# Patient Record
Sex: MALE | Race: WHITE
[De-identification: names, ages, dates, MRNs, and addresses within clinical notes are randomized per-mention and may not be internally consistent; named-entity substitution may affect disease eponyms.]

---

## 2021-12-02 ENCOUNTER — HOSPITAL ENCOUNTER (INPATIENT)
Dept: HOSPITAL 61 - ER | Age: 66
LOS: 5 days | Discharge: HOME | DRG: 177 | End: 2021-12-07
Attending: STUDENT IN AN ORGANIZED HEALTH CARE EDUCATION/TRAINING PROGRAM | Admitting: STUDENT IN AN ORGANIZED HEALTH CARE EDUCATION/TRAINING PROGRAM
Payer: COMMERCIAL

## 2021-12-02 VITALS — HEIGHT: 71 IN | WEIGHT: 212.31 LBS | BODY MASS INDEX: 29.72 KG/M2

## 2021-12-02 DIAGNOSIS — K64.9: ICD-10-CM

## 2021-12-02 DIAGNOSIS — J96.01: ICD-10-CM

## 2021-12-02 DIAGNOSIS — E46: ICD-10-CM

## 2021-12-02 DIAGNOSIS — J12.82: ICD-10-CM

## 2021-12-02 DIAGNOSIS — I10: ICD-10-CM

## 2021-12-02 DIAGNOSIS — E87.6: ICD-10-CM

## 2021-12-02 DIAGNOSIS — Z82.49: ICD-10-CM

## 2021-12-02 DIAGNOSIS — Z88.0: ICD-10-CM

## 2021-12-02 DIAGNOSIS — U07.1: Primary | ICD-10-CM

## 2021-12-02 PROCEDURE — 85025 COMPLETE CBC W/AUTO DIFF WBC: CPT

## 2021-12-02 PROCEDURE — 83605 ASSAY OF LACTIC ACID: CPT

## 2021-12-02 PROCEDURE — 80053 COMPREHEN METABOLIC PANEL: CPT

## 2021-12-02 PROCEDURE — 94618 PULMONARY STRESS TESTING: CPT

## 2021-12-02 PROCEDURE — 93005 ELECTROCARDIOGRAM TRACING: CPT

## 2021-12-02 PROCEDURE — 96365 THER/PROPH/DIAG IV INF INIT: CPT

## 2021-12-02 PROCEDURE — 71045 X-RAY EXAM CHEST 1 VIEW: CPT

## 2021-12-02 PROCEDURE — G0378 HOSPITAL OBSERVATION PER HR: HCPCS

## 2021-12-02 PROCEDURE — 96375 TX/PRO/DX INJ NEW DRUG ADDON: CPT

## 2021-12-02 PROCEDURE — 80048 BASIC METABOLIC PNL TOTAL CA: CPT

## 2021-12-02 PROCEDURE — 83880 ASSAY OF NATRIURETIC PEPTIDE: CPT

## 2021-12-02 PROCEDURE — 36415 COLL VENOUS BLD VENIPUNCTURE: CPT

## 2021-12-02 PROCEDURE — 87040 BLOOD CULTURE FOR BACTERIA: CPT

## 2021-12-02 PROCEDURE — 82803 BLOOD GASES ANY COMBINATION: CPT

## 2021-12-02 PROCEDURE — 84484 ASSAY OF TROPONIN QUANT: CPT

## 2021-12-02 PROCEDURE — 81001 URINALYSIS AUTO W/SCOPE: CPT

## 2021-12-02 PROCEDURE — 96361 HYDRATE IV INFUSION ADD-ON: CPT

## 2021-12-03 VITALS — SYSTOLIC BLOOD PRESSURE: 145 MMHG | DIASTOLIC BLOOD PRESSURE: 61 MMHG

## 2021-12-03 VITALS — DIASTOLIC BLOOD PRESSURE: 73 MMHG | SYSTOLIC BLOOD PRESSURE: 124 MMHG

## 2021-12-03 VITALS — SYSTOLIC BLOOD PRESSURE: 133 MMHG | DIASTOLIC BLOOD PRESSURE: 72 MMHG

## 2021-12-03 VITALS — DIASTOLIC BLOOD PRESSURE: 72 MMHG | SYSTOLIC BLOOD PRESSURE: 131 MMHG

## 2021-12-03 VITALS — DIASTOLIC BLOOD PRESSURE: 74 MMHG | SYSTOLIC BLOOD PRESSURE: 136 MMHG

## 2021-12-03 VITALS — DIASTOLIC BLOOD PRESSURE: 73 MMHG | SYSTOLIC BLOOD PRESSURE: 127 MMHG

## 2021-12-03 LAB
ALBUMIN SERPL-MCNC: 3 G/DL (ref 3.4–5)
ALBUMIN/GLOB SERPL: 0.8 {RATIO} (ref 1–1.7)
ALP SERPL-CCNC: 80 U/L (ref 46–116)
ALT SERPL-CCNC: 45 U/L (ref 16–63)
ANION GAP SERPL CALC-SCNC: 12 MMOL/L (ref 6–14)
AST SERPL-CCNC: 55 U/L (ref 15–37)
BASE EXCESS STD BLDV CALC-SCNC: 5 MMOL/L (ref 0–3)
BASOPHILS # BLD AUTO: 0 X10^3/UL (ref 0–0.2)
BASOPHILS NFR BLD: 0 % (ref 0–3)
BILIRUB SERPL-MCNC: 0.7 MG/DL (ref 0.2–1)
BUN SERPL-MCNC: 14 MG/DL (ref 8–26)
BUN/CREAT SERPL: 13 (ref 6–20)
CALCIUM SERPL-MCNC: 8.4 MG/DL (ref 8.5–10.1)
CHLORIDE SERPL-SCNC: 95 MMOL/L (ref 98–107)
CO2 BLDV-SCNC: 31 MMOL/L (ref 21–32)
CO2 SERPL-SCNC: 27 MMOL/L (ref 21–32)
CREAT SERPL-MCNC: 1.1 MG/DL (ref 0.7–1.3)
EOSINOPHIL NFR BLD: 0 % (ref 0–3)
EOSINOPHIL NFR BLD: 0 X10^3/UL (ref 0–0.7)
ERYTHROCYTE [DISTWIDTH] IN BLOOD BY AUTOMATED COUNT: 14 % (ref 11.5–14.5)
GFR SERPLBLD BASED ON 1.73 SQ M-ARVRAT: 67.4 ML/MIN
GLUCOSE SERPL-MCNC: 108 MG/DL (ref 70–99)
HCO3 BLDV-SCNC: 30 MMOL/L (ref 24–28)
HCT VFR BLD CALC: 46.4 % (ref 39–53)
HGB BLD-MCNC: 16.1 G/DL (ref 13–17.5)
INSPIRATION SETTING TIME VENT: 21
LYMPHOCYTES # BLD: 0.6 X10^3/UL (ref 1–4.8)
LYMPHOCYTES NFR BLD AUTO: 9 % (ref 24–48)
MCH RBC QN AUTO: 31 PG (ref 25–35)
MCHC RBC AUTO-ENTMCNC: 35 G/DL (ref 31–37)
MCV RBC AUTO: 90 FL (ref 79–100)
MONO #: 0.5 X10^3/UL (ref 0–1.1)
MONOCYTES NFR BLD: 7 % (ref 0–9)
NEUT #: 5.9 X10^3/UL (ref 1.8–7.7)
NEUTROPHILS NFR BLD AUTO: 84 % (ref 31–73)
PCO2 BLDV: 45 MMHG (ref 41–51)
PH BLDV: 7.43 [PH] (ref 7.32–7.42)
PLATELET # BLD AUTO: 180 X10^3/UL (ref 140–400)
PO2 BLDV: 42 MMHG (ref 20–40)
POTASSIUM SERPL-SCNC: 3.2 MMOL/L (ref 3.5–5.1)
PROT SERPL-MCNC: 7 G/DL (ref 6.4–8.2)
RBC # BLD AUTO: 5.15 X10^6/UL (ref 4.3–5.7)
SAO2 % BLDV FROM PO2: 78 %
SODIUM SERPL-SCNC: 134 MMOL/L (ref 136–145)
WBC # BLD AUTO: 7 X10^3/UL (ref 4–11)

## 2021-12-03 PROCEDURE — XW033E5 INTRODUCTION OF REMDESIVIR ANTI-INFECTIVE INTO PERIPHERAL VEIN, PERCUTANEOUS APPROACH, NEW TECHNOLOGY GROUP 5: ICD-10-PCS | Performed by: INTERNAL MEDICINE

## 2021-12-03 RX ADMIN — HEPARIN SODIUM SCH UNIT: 5000 INJECTION, SOLUTION INTRAVENOUS; SUBCUTANEOUS at 22:42

## 2021-12-03 NOTE — HP
DATE OF SERVICE: 12/03/2021

ADMIT DATE: 12/03/2021

CHIEF COMPLAINT:  Respiratory failure and shortness of breath and cough, 

COVID-19.



HISTORY OF PRESENT ILLNESS:  The patient is a pleasant 66-year-old male who was 

diagnosed with COVID-19 four days ago.  He has been treated as an outpatient, 

but his symptoms are worsening.  It should be noted that he did get the first 

dose of the Pfizer vaccine.  I discussed the case with the ER physician.  It 

appears the patient needs full COVID protocol.  We are going to admit the 

patient and consult Pulmonary Medicine, give him remdesivir, antibiotics, 

vitamins, minerals, cough syrup, oxygen, aspirin and Lovenox.



PAST MEDICAL HISTORY:  Gastritis, hemorrhoids, reaction to anesthesia, umbilical

hernia repair, right arm nerve decompression.



ALLERGIES:  PENICILLIN.



FAMILY HISTORY:  Hypertension.



SOCIAL HISTORY:  He does not drink, smoke or take drugs.  He makes a parts for 

[employer].



MEDICATIONS:  Reviewed, please refer to the MRAD.



REVIEW OF SYSTEMS:

GENERAL:  No history of weight change, weakness or fevers.

SKIN:  No bruising, hair changes or rashes.

EYES:  No blurred, double or loss of vision.

NOSE AND THROAT:  No history of nosebleeds, hoarseness or sore throat.

HEART:  No history of palpitations, chest pain or shortness of breath on 

exertion.

LUNGS:  He complains of shortness of breath and cough.

GASTROINTESTINAL:  Denies changes in appetite, nausea, vomiting, diarrhea or 

constipation.

GENITOURINARY:  No history of frequency, urgency, hesitancy or nocturia.

NEUROLOGIC:  Denies history of numbness, tingling, tremor or weakness.

PSYCHIATRIC:  No history of panic, anxiety or depression.

ENDOCRINE:  No history of heat or cold intolerance, polyuria or polydipsia.

EXTREMITIES:  Denies muscle weakness, joint pain, pain on walking or stiffness.



PHYSICAL EXAMINATION:

VITALS:  Within normal limits and are stable.

GENERAL:  No apparent distress.  Alert and oriented.

HEENT:  Normal cephalic atraumatic, external auditory canals are patent.  Eyes: 

Extraocular muscles are intact, pupils are equally round and reactive to light 

and accommodation.

MUSCULOSKELETAL:  Well developed, well nourished, good range of motion.

ENDOCRINE:  No thyromegaly was palpated.

LYMPHATICS:  No cervical chain or axillary nodes were noted.

HEMATOPOIETIC:  No bruising.

NECK:  Supple, no JVD, no thyromegaly was noted.

LUNGS:  He has bibasilar crackles.

HEART:  RRR, S1, S2 present.  Peripheral pulses intact, no obvious murmurs were 

noted.

ABDOMEN:  Soft, nontender.  Positive bowel sounds, no organomegaly, normal bowel

sounds.

EXTREMITIES:  Without any cyanosis, clubbing, or edema.  Pedal pulses intact, 

Homans sign is negative.

NEUROLOGIC:  Normal speech, normal tone.  A and O x 3, moves all extremities, no

obvious focal deficits.

PSYCHIATRIC:  Normal affect, normal mood.  Stable.

SKIN:  No ulcerations or rashes, good skin turgor, no jaundice.

VASCULAR:  Good capillary refill, neurovascular bundle appears to be intact.



LABORATORY DATA:  White count is 7.  Electrolytes:  Sodium 134, potassium 3.2, 

chloride 95, bicarbonate 27, BUN 14, creatinine 1.1, glucose 108.  Chest x-ray 

shows bilateral airspace opacities suggestive of pneumonia.



ASSESSMENT AND PLAN:  COVID-19 respiratory failure.  The patient has been 

admitted.  We will start him on IV Solu-Medrol, remdesivir, oxygen per nasal 

cannula, antibiotics, vitamins and minerals, beta agonist, codeine cough syrup, 

aspirin, and Lovenox.  Home meds.  Deep venous thrombosis prophylaxis.  Full 

code.  Prognosis guarded.







CÉSAR/RAE

DR: Tam   DD: 12/03/2021 11:59

DT: 12/03/2021 12:20   TID: [id number]

## 2021-12-03 NOTE — EKG
[facility]

              [address] [place] [address]

Test Date:    2021               Test Time:    01:43:22

Pat Name:     JARED DOS SANTOS          Department:   

Patient ID:   [id number]           Room:         2 1

Gender:       M                        Technician:   

:          [id number]               Requested By: SANDIP DURÁN

Order Number: [id number]           Reading MD:   Ricco Knapp MD

                                 Measurements

Intervals                              Axis          

Rate:         79                       P:            -59

NJ:           124                      QRS:          -25

QRSD:         102                      T:            42

QT:           370                                    

QTc:          425                                    

                           Interpretive Statements

SINUS RHYTHM

Electronically Signed On 2021 20:54:15 CST by Ricco Knapp MD

## 2021-12-03 NOTE — PDOC
PULMONARY PROGRESS NOTES


DATE: 12/3/21 


TIME: 08:53


Vitals





Vital Signs








  Date Time  Temp Pulse Resp B/P (MAP) Pulse Ox O2 Delivery O2 Flow Rate FiO2


 


12/3/21 07:00 97.5 68 20 136/74 (94) 92 Nasal Cannula  





 97.5       


 


12/3/21 04:00       2.0 








Labs





Laboratory Tests








Test


 12/3/21


00:59 12/3/21


02:53


 


White Blood Count


 7.0 x10^3/uL


(4.0-11.0) 





 


Red Blood Count


 5.15 x10^6/uL


(4.30-5.70) 





 


Hemoglobin


 16.1 g/dL


(13.0-17.5) 





 


Hematocrit


 46.4 %


(39.0-53.0) 





 


Mean Corpuscular Volume 90 fL ()  


 


Mean Corpuscular Hemoglobin 31 pg (25-35)  


 


Mean Corpuscular Hemoglobin


Concent 35 g/dL


(31-37) 





 


Red Cell Distribution Width


 14.0 %


(11.5-14.5) 





 


Platelet Count


 180 x10^3/uL


(140-400) 





 


Neutrophils (%) (Auto) 84 % (31-73)  


 


Lymphocytes (%) (Auto) 9 % (24-48)  


 


Monocytes (%) (Auto) 7 % (0-9)  


 


Eosinophils (%) (Auto) 0 % (0-3)  


 


Basophils (%) (Auto) 0 % (0-3)  


 


Neutrophils # (Auto)


 5.9 x10^3/uL


(1.8-7.7) 





 


Lymphocytes # (Auto)


 0.6 x10^3/uL


(1.0-4.8) 





 


Monocytes # (Auto)


 0.5 x10^3/uL


(0.0-1.1) 





 


Eosinophils # (Auto)


 0.0 x10^3/uL


(0.0-0.7) 





 


Basophils # (Auto)


 0.0 x10^3/uL


(0.0-0.2) 





 


Sodium Level


 134 mmol/L


(136-145) 





 


Potassium Level


 3.2 mmol/L


(3.5-5.1) 





 


Chloride Level


 95 mmol/L


() 





 


Carbon Dioxide Level


 27 mmol/L


(21-32) 





 


Anion Gap 12 (6-14)  


 


Blood Urea Nitrogen


 14 mg/dL


(8-26) 





 


Creatinine


 1.1 mg/dL


(0.7-1.3) 





 


Estimated GFR


(Cockcroft-Gault) 67.4 


 





 


BUN/Creatinine Ratio 13 (6-20)  


 


Glucose Level


 108 mg/dL


(70-99) 





 


Lactic Acid Level


 1.2 mmol/L


(0.4-2.0) 





 


Calcium Level


 8.4 mg/dL


(8.5-10.1) 





 


Total Bilirubin


 0.7 mg/dL


(0.2-1.0) 





 


Aspartate Amino Transf


(AST/SGOT) 55 U/L (15-37) 


 





 


Alanine Aminotransferase


(ALT/SGPT) 45 U/L (16-63) 


 





 


Alkaline Phosphatase


 80 U/L


() 





 


Troponin I High Sensitivity 8 ng/L (4-75)  


 


NT-Pro-B-Type Natriuretic


Peptide 66 pg/mL


(0-124) 





 


Total Protein


 7.0 g/dL


(6.4-8.2) 





 


Albumin


 3.0 g/dL


(3.4-5.0) 





 


Albumin/Globulin Ratio 0.8 (1.0-1.7)  


 


Bedside Venous pH


 


 7.43


(7.32-7.42)


 


Bedside Venous pCO2


 


 45 mmHg


(41-51)


 


Bedside Venous pO2


 


 42 mmHg


(20-40)


 


Venous Blood HCO3


 


 30 mmol/L


(24-28)


 


POC Venous O2 Saturation


(Saul) 


 78 % 





 


Bedside FiO2  21.0 








Laboratory Tests








Test


 12/3/21


00:59 12/3/21


02:53


 


White Blood Count


 7.0 x10^3/uL


(4.0-11.0) 





 


Red Blood Count


 5.15 x10^6/uL


(4.30-5.70) 





 


Hemoglobin


 16.1 g/dL


(13.0-17.5) 





 


Hematocrit


 46.4 %


(39.0-53.0) 





 


Mean Corpuscular Volume 90 fL ()  


 


Mean Corpuscular Hemoglobin 31 pg (25-35)  


 


Mean Corpuscular Hemoglobin


Concent 35 g/dL


(31-37) 





 


Red Cell Distribution Width


 14.0 %


(11.5-14.5) 





 


Platelet Count


 180 x10^3/uL


(140-400) 





 


Neutrophils (%) (Auto) 84 % (31-73)  


 


Lymphocytes (%) (Auto) 9 % (24-48)  


 


Monocytes (%) (Auto) 7 % (0-9)  


 


Eosinophils (%) (Auto) 0 % (0-3)  


 


Basophils (%) (Auto) 0 % (0-3)  


 


Neutrophils # (Auto)


 5.9 x10^3/uL


(1.8-7.7) 





 


Lymphocytes # (Auto)


 0.6 x10^3/uL


(1.0-4.8) 





 


Monocytes # (Auto)


 0.5 x10^3/uL


(0.0-1.1) 





 


Eosinophils # (Auto)


 0.0 x10^3/uL


(0.0-0.7) 





 


Basophils # (Auto)


 0.0 x10^3/uL


(0.0-0.2) 





 


Sodium Level


 134 mmol/L


(136-145) 





 


Potassium Level


 3.2 mmol/L


(3.5-5.1) 





 


Chloride Level


 95 mmol/L


() 





 


Carbon Dioxide Level


 27 mmol/L


(21-32) 





 


Anion Gap 12 (6-14)  


 


Blood Urea Nitrogen


 14 mg/dL


(8-26) 





 


Creatinine


 1.1 mg/dL


(0.7-1.3) 





 


Estimated GFR


(Cockcroft-Gault) 67.4 


 





 


BUN/Creatinine Ratio 13 (6-20)  


 


Glucose Level


 108 mg/dL


(70-99) 





 


Lactic Acid Level


 1.2 mmol/L


(0.4-2.0) 





 


Calcium Level


 8.4 mg/dL


(8.5-10.1) 





 


Total Bilirubin


 0.7 mg/dL


(0.2-1.0) 





 


Aspartate Amino Transf


(AST/SGOT) 55 U/L (15-37) 


 





 


Alanine Aminotransferase


(ALT/SGPT) 45 U/L (16-63) 


 





 


Alkaline Phosphatase


 80 U/L


() 





 


Troponin I High Sensitivity 8 ng/L (4-75)  


 


NT-Pro-B-Type Natriuretic


Peptide 66 pg/mL


(0-124) 





 


Total Protein


 7.0 g/dL


(6.4-8.2) 





 


Albumin


 3.0 g/dL


(3.4-5.0) 





 


Albumin/Globulin Ratio 0.8 (1.0-1.7)  


 


Bedside Venous pH


 


 7.43


(7.32-7.42)


 


Bedside Venous pCO2


 


 45 mmHg


(41-51)


 


Bedside Venous pO2


 


 42 mmHg


(20-40)


 


Venous Blood HCO3


 


 30 mmol/L


(24-28)


 


POC Venous O2 Saturation


(Saul) 


 78 % 





 


Bedside FiO2  21.0 








Medications





Active Scripts








 Medications  Dose


 Route/Sig


 Max Daily Dose Days Date Category


 


 Triamterene-Hctz


 37.5-25 Mg Cp


  (Triamterene/Hydrochlorothiazid)


 1 Each Capsule  1 Cap


 PO DAILY


   6/11/19 Reported


 


 Micardis Hct


 80-12.5 Mg Tablet


  (Telmisartan/Hydrochlorothiazid)


 1 Each Tablet  1 Tab


 PO DAILY


   6/11/19 Reported


 


 Ranitidine Hcl


 150 Mg Capsule  1 Cap


 PO BID


   12/21/16 Reported


 


 Aspir 81


  (Aspirin) 81 Mg


 Tablet.  1 Tab


 PO DAILY


   12/21/16 Reported











Impression


.


Respiratory failure secondary to COVID-19 viral pneumonia see orders











STACEY MARADIAGA MD               Dec 3, 2021 08:53

## 2021-12-03 NOTE — RAD
EXAM:  XR CHEST 1V 12/3/2021 12:52 AM



CLINICAL INDICATION:  Covid



COMPARISON:  None



TECHNIQUE:  AP upright view of the chest



FINDINGS:  The heart is mildly enlarged. Lungs are adequately expanded. There are airspace opacities 
in the perihilar left lung and a few scattered opacities in the right lung. No pleural effusion or pn
eumothorax. No acute osseous abnormality.





IMPRESSION:  Bilateral airspace opacities, which could be due to pneumonia or pulmonary edema.





Electronically signed by: Janice Irizarry MD (12/3/2021 1:06 AM) [facility]

## 2021-12-03 NOTE — NUR
SW following. Discussed with RN, pt from home with spouse, 2L (does not use oxygen at home), 
regular diet, independent. COVID-19 positive. RN advised no SW needs at this time. SW will 
continue to follow.

## 2021-12-03 NOTE — ED.ADGEN
Past Medical History


Additional Past Medical Histor:  GASTRITIS, HEMORRHOIDS, REACTION TO ANESTHESIA


Past Surgical History:  Other


Additional Past Surgical Histo:  UMBILICAL HERNIA REPAIR, RIGHT ARM NERVE 

DECOMPRESSION





General Adult


EDM:


Chief Complaint:  SHORTNESS OF BREATH





HPI:


HPI:





Patient is a 66  year old 66-year-old male who is here for worsening Covid 

symptoms. Patient states he was diagnosed with COVID-19 4 days ago. Patient 

states that 2 days prior to that he had gotten his first dose of his Pfizer 

vaccine and felt well at that time, symptoms started about 5 days ago. Patient 

has been taking Tylenol for his fevers but no other medications. States he has a

productive cough, has dyspnea with exertion, decreased appetite, abdominal pain.

Denies any vomiting or diarrhea. Has a history of hypertension. He states that 

checking his oxygen at home his pulse ox got down to 83 to 84%. Denies any 

history of lung disease or smoking





Review of Systems:


Review of Systems:


All other systems within normal limits except for as noted in the HPI





Current Medications:





Current Medications








 Medications


  (Trade)  Dose


 Ordered  Sig/Rosalba  Start Time


 Stop Time Status Last Admin


Dose Admin


 


 Acetaminophen


  (Tylenol)  1,000 mg  1X  ONCE  12/3/21 01:00


 12/3/21 01:01 DC 12/3/21 01:04


1,000 MG


 


 Sodium Chloride  1,000 ml @ 


 1,000 mls/hr  1X  ONCE  12/3/21 01:00


 12/3/21 01:59  12/3/21 01:00


1,000 MLS/HR











Allergies:


Allergies:





Allergies








Coded Allergies Type Severity Reaction Last Updated Verified


 


  Penicillins Allergy Intermediate Hives 19 Yes











Physical Exam:


PE:


Constitutional: Well developed, well nourished, no acute distress, non-toxic 

appearance. []


HENT: Normocephalic, atraumatic, bilateral external ears normal,  nose normal. 

[]


Eyes: PERRLA, conjunctiva normal, no discharge. [] 


Neck: No rigidity, supple, no stridor. [] 


Cardiovascular: Regular rate and rhythm, brisk cap refill []


Lungs & Thorax: Non labored symmetric respirations, no tachypnea or respiratory 

distress []


Abdomen: Soft, nondistended.


Skin: Warm, dry, no erythema, no rash. [] 


Back: Unremarkable


Extremities: No deformities, range of motion grossly intact, no lower extremity 

edema [] 


Neurologic: Alert and oriented X 3, no focal deficits noted. []


Psychologic: Affect normal, judgement normal, mood normal. []





Current Patient Data:


Labs:





                                Laboratory Tests








Test


 12/3/21


00:59


 


White Blood Count


 7.0 x10^3/uL


(4.0-11.0)


 


Red Blood Count


 5.15 x10^6/uL


(4.30-5.70)


 


Hemoglobin


 16.1 g/dL


(13.0-17.5)


 


Hematocrit


 46.4 %


(39.0-53.0)


 


Mean Corpuscular Volume


 90 fL ()





 


Mean Corpuscular Hemoglobin 31 pg (25-35)  


 


Mean Corpuscular Hemoglobin


Concent 35 g/dL


(31-37)


 


Red Cell Distribution Width


 14.0 %


(11.5-14.5)


 


Platelet Count


 180 x10^3/uL


(140-400)


 


Neutrophils (%) (Auto) 84 % (31-73)  H


 


Lymphocytes (%) (Auto) 9 % (24-48)  L


 


Monocytes (%) (Auto) 7 % (0-9)  


 


Eosinophils (%) (Auto) 0 % (0-3)  


 


Basophils (%) (Auto) 0 % (0-3)  


 


Neutrophils # (Auto)


 5.9 x10^3/uL


(1.8-7.7)


 


Lymphocytes # (Auto)


 0.6 x10^3/uL


(1.0-4.8)  L


 


Monocytes # (Auto)


 0.5 x10^3/uL


(0.0-1.1)


 


Eosinophils # (Auto)


 0.0 x10^3/uL


(0.0-0.7)


 


Basophils # (Auto)


 0.0 x10^3/uL


(0.0-0.2)


 


Sodium Level


 134 mmol/L


(136-145)  L


 


Potassium Level


 3.2 mmol/L


(3.5-5.1)  L


 


Chloride Level


 95 mmol/L


()  L


 


Carbon Dioxide Level


 27 mmol/L


(21-32)


 


Anion Gap 12 (6-14)  


 


Blood Urea Nitrogen


 14 mg/dL


(8-26)


 


Creatinine


 1.1 mg/dL


(0.7-1.3)


 


Estimated GFR


(Cockcroft-Gault) 67.4  





 


BUN/Creatinine Ratio 13 (6-20)  


 


Glucose Level


 108 mg/dL


(70-99)  H


 


Lactic Acid Level


 1.2 mmol/L


(0.4-2.0)


 


Calcium Level


 8.4 mg/dL


(8.5-10.1)  L


 


Total Bilirubin


 0.7 mg/dL


(0.2-1.0)


 


Aspartate Amino Transferase


(AST) 55 U/L (15-37)


H


 


Alanine Aminotransferase (ALT)


 45 U/L (16-63)





 


Alkaline Phosphatase


 80 U/L


()


 


Troponin I High Sensitivity 8 ng/L (4-75)  


 


NT-Pro-B-Type Natriuretic


Peptide 66 pg/mL


(0-124)


 


Total Protein


 7.0 g/dL


(6.4-8.2)


 


Albumin


 3.0 g/dL


(3.4-5.0)  L


 


Albumin/Globulin Ratio


 0.8 (1.0-1.7)


L





                                Laboratory Tests


12/3/21 00:59








                                Laboratory Tests


12/3/21 00:59








Vital Signs:





                                   Vital Signs








  Date Time  Temp Pulse Resp B/P (MAP) Pulse Ox O2 Delivery O2 Flow Rate FiO2


 


12/3/21 00:20 99.7 92 20 124/69 (87) 92 Room Air  





 99.7       











EKG:


EKG:


Sinus rhythm, heart rate 70/min, left extubation, no ST elevation depression, no

 ectopy []





Heart Score:


C/O Chest Pain:  No


HEART Score for Chest Pain:  








HEART Score for Chest Pain Response (Comments) Value


 


History Slighlty/Non-Suspicious 0


 


ECG Normal 0


 


Age >45 - < 65 1


 


Risk Factors                            1 or 2 Risk Factors 1


 


Troponin < Normal Limit 0


 


Total  2








Risk Factors:


Risk Factors:  DM, Current or recent (<one month) smoker, HTN, HLP, family 

history of CAD, obesity.


Risk Scores:


Score 0 - 3:  2.5% MACE over next 6 weeks - Discharge Home


Score 4 - 6:  20.3% MACE over next 6 weeks - Admit for Clinical Observation


Score 7 - 10:  72.7% MACE over next 6 weeks - Early Invasive Strategies





Radiology/Procedures:


Radiology/Procedures:


[facility]


                    [address] [place] [address]


                                 [phone]


                                        


                                 IMAGING REPORT





                                     Signed





PATIENT: JARED DOS SANTOS BACCOUNT: [id number]     MRN#: [id number]


: 1955           LOCATION: ER              AGE: 66


SEX: M                    EXAM DT: 21         ACCESSION#: [id number]


STATUS: REG ER            ORD. PHYSICIAN: JANICE DURÁN MD


REASON: covid


PROCEDURE: CHEST AP ONLY





EXAM:  XR CHEST 1V 12/3/2021 12:52 AM





CLINICAL INDICATION:  Covid





COMPARISON:  None





TECHNIQUE:  AP upright view of the chest





FINDINGS:  The heart is mildly enlarged. Lungs are adequately expanded. There 

are airspace opacities in the perihilar left lung and a few scattered opacities 

in the right lung. No pleural effusion or pneumothorax. No acute osseous 

abnormality.








IMPRESSION:  Bilateral airspace opacities, which could be due to pneumonia or 

pulmonary edema.








Electronically signed by: Janiec Irizarry MD (12/3/2021 1:06 AM) [facility]














DICTATED and SIGNED BY:     JANICE IRIZARRY MD


DATE:     21 [id number]


[]





Course & Med Decision Making:


Course & Med Decision Making


Pertinent Labs and Imaging studies reviewed. (See chart for details)


Patient De-satting to 88%, placed on nasal cannula to hospitalist.


[]





Dragon Disclaimer:


Dragon Disclaimer:


This electronic medical record was generated, in whole or in part, using a voice

 recognition dictation system.





Departure


Departure


Impression:  


   Primary Impression:  


   COVID


Disposition:   ADMITTED AS INPATIENT


Admitting Physician:  HIMS


Condition:  STABLE


Referrals:  


SORAIDA SINGH MD (PCP)











JANICE DURÁN MD             Dec 3, 2021 00:42

## 2021-12-04 VITALS — SYSTOLIC BLOOD PRESSURE: 126 MMHG | DIASTOLIC BLOOD PRESSURE: 78 MMHG

## 2021-12-04 VITALS — DIASTOLIC BLOOD PRESSURE: 80 MMHG | SYSTOLIC BLOOD PRESSURE: 138 MMHG

## 2021-12-04 VITALS — SYSTOLIC BLOOD PRESSURE: 123 MMHG | DIASTOLIC BLOOD PRESSURE: 76 MMHG

## 2021-12-04 VITALS — SYSTOLIC BLOOD PRESSURE: 125 MMHG | DIASTOLIC BLOOD PRESSURE: 73 MMHG

## 2021-12-04 VITALS — SYSTOLIC BLOOD PRESSURE: 115 MMHG | DIASTOLIC BLOOD PRESSURE: 74 MMHG

## 2021-12-04 VITALS — SYSTOLIC BLOOD PRESSURE: 122 MMHG | DIASTOLIC BLOOD PRESSURE: 73 MMHG

## 2021-12-04 LAB
APTT PPP: YELLOW S
BACTERIA #/AREA URNS HPF: 0 /HPF
BILIRUB UR QL STRIP: NEGATIVE
FIBRINOGEN PPP-MCNC: CLEAR MG/DL
NITRITE UR QL STRIP: NEGATIVE
PH UR STRIP: 6 [PH]
PROT UR STRIP-MCNC: 30 MG/DL
RBC #/AREA URNS HPF: (no result) /HPF (ref 0–2)
UROBILINOGEN UR-MCNC: 0.2 MG/DL
WBC #/AREA URNS HPF: 0 /HPF (ref 0–4)

## 2021-12-04 RX ADMIN — REMDESIVIR SCH MLS/HR: 100 INJECTION, POWDER, LYOPHILIZED, FOR SOLUTION INTRAVENOUS at 11:41

## 2021-12-04 RX ADMIN — LOSARTAN POTASSIUM SCH MG: 50 TABLET ORAL at 09:31

## 2021-12-04 RX ADMIN — HEPARIN SODIUM SCH UNIT: 5000 INJECTION, SOLUTION INTRAVENOUS; SUBCUTANEOUS at 06:20

## 2021-12-04 RX ADMIN — HEPARIN SODIUM SCH UNIT: 5000 INJECTION, SOLUTION INTRAVENOUS; SUBCUTANEOUS at 15:03

## 2021-12-04 RX ADMIN — PANTOPRAZOLE SODIUM SCH MG: 40 TABLET, DELAYED RELEASE ORAL at 06:20

## 2021-12-04 RX ADMIN — HEPARIN SODIUM SCH UNIT: 5000 INJECTION, SOLUTION INTRAVENOUS; SUBCUTANEOUS at 22:00

## 2021-12-04 RX ADMIN — ASPIRIN SCH MG: 81 TABLET, COATED ORAL at 09:31

## 2021-12-04 RX ADMIN — TRIAMTERENE AND HYDROCHLOROTHIAZIDE SCH TAB: 37.5; 25 TABLET ORAL at 09:31

## 2021-12-04 NOTE — PDOC
[facility] PROGRESS NOTE


Date of Service


DOS:


DATE: 12/4/21 


TIME: 11:15





Chief Complaint


Chief Complaint


Covid-19 respiratory failure


Gastritis, hemorrhoids, reaction to anesthesia, umbilical


hernia repair, right arm nerve decompression.





History of Present Illness


History of Present Illness


12/4/2021


Patient seen and examined


Patient is awake, alert, sitting and eating on side of bed, in NAD


Patient reports some shortness of breath on walking to and from the bathroom


Otherwise reports feeling quite better since admission


4L O2 per NC, 92% sat


Chart reviewed


Discussed with RN





Vitals/I&O


Vitals/I&O:





                                   Vital Signs








  Date Time  Temp Pulse Resp B/P (MAP) Pulse Ox O2 Delivery O2 Flow Rate FiO2


 


12/4/21 09:31  65  123/76    


 


12/4/21 08:00 97.5  18  94 Nasal Cannula 2.0 





 97.5       














                                    I & O   


 


 12/3/21 12/3/21 12/4/21





 15:00 23:00 07:00


 


Output Total   0 ml


 


Balance   0 ml











Physical Exam


General:  Alert, Oriented X3, No acute distress


Heart:  Regular rate, Normal S1, Normal S2, No murmurs


Lungs:  Crackles


Abdomen:  Normal bowel sounds


Extremities:  No cyanosis, Normal pulses


Skin:  No rashes, No significant lesion





Labs


Labs:





Laboratory Tests








Test


 12/4/21


06:00


 


Urine Collection Type Unknown 


 


Urine Color Yellow 


 


Urine Clarity Clear 


 


Urine pH 6.0 (<5.0-8.0) 


 


Urine Specific Gravity


 1.025


(1.000-1.030)


 


Urine Protein


 30 mg/dL


(NEG-TRACE)


 


Urine Glucose (UA)


 Negative mg/dL


(NEG)


 


Urine Ketones (Stick)


 Negative mg/dL


(NEG)


 


Urine Blood Trace (NEG) 


 


Urine Nitrite Negative (NEG) 


 


Urine Bilirubin Negative (NEG) 


 


Urine Urobilinogen Dipstick


 0.2 mg/dL (0.2


mg/dL)


 


Urine Leukocyte Esterase Negative (NEG) 


 


Urine RBC Occ /HPF (0-2) 


 


Urine WBC 0 /HPF (0-4) 


 


Urine Squamous Epithelial


Cells Few /LPF 





 


Urine Bacteria 0 /HPF (0-FEW) 











Assessment and Plan


Assessmemt and Plan


Problems


Medical Problems:


(1) COVID


Status: Acute


Covid-19 respiratory failure


Gastritis, hemorrhoids, reaction to anesthesia, umbilical


hernia repair, right arm nerve decompression.








Plan:


Continue remdesivir


Continue 4L O2 per NC


Continue IV antibiotics


Continue vitamins and minerals


Continue beta agonist


Continue codeine cough syrup


Continue aspirin


Continue Lovenox


Home meds


DVT prophylaxis


Full code


Appreciate pulmonary input


Prognosis guarded.


  








Comment


Review of Relevant


I have reviewed the following items gilbert (where applicable) has been applied.


Medications:





Current Medications








 Medications


  (Trade)  Dose


 Ordered  Sig/Rosalba


 Route


 PRN Reason  Start Time


 Stop Time Status Last Admin


Dose Admin


 


 Remdesivir 200 mg/


 Sodium Chloride  210 ml @ 


 210 mls/hr  1X  ONCE


 IV


   12/3/21 11:30


 12/3/21 12:29 DC 12/3/21 12:25





 


 Amlodipine


 Besylate


  (Norvasc)  10 mg  DAILY


 PO


   12/4/21 09:00


    12/4/21 09:30





 


 Aspirin


  (Ecotrin)  81 mg  DAILY


 PO


   12/4/21 09:00


    12/4/21 09:31





 


 Pantoprazole


 Sodium


  (Protonix)  40 mg  DAILYAC


 PO


   12/4/21 07:30


    12/4/21 06:20





 


 Losartan Potassium


  (Cozaar)  100 mg  DAILY


 PO


   12/4/21 09:00


    12/4/21 09:31





 


 Triamterene/HCTZ


  (Maxzide 37.5/


 25mg)  1 tab  DAILY


 PO


   12/4/21 09:00


    12/4/21 09:31





 


 Dexamethasone


 Sodium Phosphate


  (Decadron)  6 mg  DAILY


 IVP


   12/4/21 09:00


    12/4/21 09:31





 


 Heparin Sodium


  (Porcine)


  (Heparin Sodium)  5,000 unit  Q8HRS


 SQ


   12/3/21 22:00


    12/4/21 06:20














Justifications for Admission


Other Justification














KASI CAO III DO            Dec 4, 2021 11:19

## 2021-12-04 NOTE — PDOC
PULMONARY PROGRESS NOTES


DATE: 12/4/21 


TIME: 07:23


Subjective


on 02 4lpm    has sob  not worse   has cough  is tired


Vitals





Vital Signs








  Date Time  Temp Pulse Resp B/P (MAP) Pulse Ox O2 Delivery O2 Flow Rate FiO2


 


12/4/21 03:07 97.8 66 18 125/73 (90) 92 Nasal Cannula  





 97.8       


 


12/4/21 00:00       4.0 








Comments


no distress


nc at


rrr


no accessory muscle use


abd obese


no rash


Labs





Laboratory Tests








Test


 12/3/21


00:59 12/3/21


02:53


 


White Blood Count


 7.0 x10^3/uL


(4.0-11.0) 





 


Red Blood Count


 5.15 x10^6/uL


(4.30-5.70) 





 


Hemoglobin


 16.1 g/dL


(13.0-17.5) 





 


Hematocrit


 46.4 %


(39.0-53.0) 





 


Mean Corpuscular Volume 90 fL ()  


 


Mean Corpuscular Hemoglobin 31 pg (25-35)  


 


Mean Corpuscular Hemoglobin


Concent 35 g/dL


(31-37) 





 


Red Cell Distribution Width


 14.0 %


(11.5-14.5) 





 


Platelet Count


 180 x10^3/uL


(140-400) 





 


Neutrophils (%) (Auto) 84 % (31-73)  


 


Lymphocytes (%) (Auto) 9 % (24-48)  


 


Monocytes (%) (Auto) 7 % (0-9)  


 


Eosinophils (%) (Auto) 0 % (0-3)  


 


Basophils (%) (Auto) 0 % (0-3)  


 


Neutrophils # (Auto)


 5.9 x10^3/uL


(1.8-7.7) 





 


Lymphocytes # (Auto)


 0.6 x10^3/uL


(1.0-4.8) 





 


Monocytes # (Auto)


 0.5 x10^3/uL


(0.0-1.1) 





 


Eosinophils # (Auto)


 0.0 x10^3/uL


(0.0-0.7) 





 


Basophils # (Auto)


 0.0 x10^3/uL


(0.0-0.2) 





 


Sodium Level


 134 mmol/L


(136-145) 





 


Potassium Level


 3.2 mmol/L


(3.5-5.1) 





 


Chloride Level


 95 mmol/L


() 





 


Carbon Dioxide Level


 27 mmol/L


(21-32) 





 


Anion Gap 12 (6-14)  


 


Blood Urea Nitrogen


 14 mg/dL


(8-26) 





 


Creatinine


 1.1 mg/dL


(0.7-1.3) 





 


Estimated GFR


(Cockcroft-Gault) 67.4 


 





 


BUN/Creatinine Ratio 13 (6-20)  


 


Glucose Level


 108 mg/dL


(70-99) 





 


Lactic Acid Level


 1.2 mmol/L


(0.4-2.0) 





 


Calcium Level


 8.4 mg/dL


(8.5-10.1) 





 


Total Bilirubin


 0.7 mg/dL


(0.2-1.0) 





 


Aspartate Amino Transf


(AST/SGOT) 55 U/L (15-37) 


 





 


Alanine Aminotransferase


(ALT/SGPT) 45 U/L (16-63) 


 





 


Alkaline Phosphatase


 80 U/L


() 





 


Troponin I High Sensitivity 8 ng/L (4-75)  


 


NT-Pro-B-Type Natriuretic


Peptide 66 pg/mL


(0-124) 





 


Total Protein


 7.0 g/dL


(6.4-8.2) 





 


Albumin


 3.0 g/dL


(3.4-5.0) 





 


Albumin/Globulin Ratio 0.8 (1.0-1.7)  


 


Bedside Venous pH


 


 7.43


(7.32-7.42)


 


Bedside Venous pCO2


 


 45 mmHg


(41-51)


 


Bedside Venous pO2


 


 42 mmHg


(20-40)


 


Venous Blood HCO3


 


 30 mmol/L


(24-28)


 


POC Venous O2 Saturation


(Saul) 


 78 % 





 


Bedside FiO2  21.0 








Medications





Active Scripts








 Medications  Dose


 Route/Sig


 Max Daily Dose Days Date Category


 


 Triamterene-Hctz


 37.5-25 Mg Cp


  (Triamterene/Hydrochlorothiazid)


 1 Each Capsule  1 Cap


 PO DAILY


   6/11/19 Reported


 


 Micardis Hct


 80-12.5 Mg Tablet


  (Telmisartan/Hydrochlorothiazid)


 1 Each Tablet  1 Tab


 PO DAILY


   6/11/19 Reported


 


 Ranitidine Hcl


 150 Mg Capsule  1 Cap


 PO BID


   12/21/16 Reported


 


 Aspir 81


  (Aspirin) 81 Mg


 Tablet.  1 Tab


 PO DAILY


   12/21/16 Reported











Impression


.


IMPRESSION:


1.  Acute hypoxemic respiratory failure secondary to COVID-19 viral pneumonia.


2.  COVID-19 viral pneumonia.


3.  Hypokalemia.


4.  Protein malnutrition, present upon admission.


5.  Respiratory failure secondary to COVID-19 viral pneumonia.





Plan


.


PLAN:


1.  dexamethasone.


2.  Remdesivir.


3.  Empiric antibiotics.


4.  hep sq  for dvt prophylaxis


5.  02 titration to keep sat 90%





discussed w PAIGE Chavez MD                Dec 4, 2021 07:25

## 2021-12-05 VITALS — DIASTOLIC BLOOD PRESSURE: 75 MMHG | SYSTOLIC BLOOD PRESSURE: 121 MMHG

## 2021-12-05 VITALS — SYSTOLIC BLOOD PRESSURE: 124 MMHG | DIASTOLIC BLOOD PRESSURE: 73 MMHG

## 2021-12-05 VITALS — DIASTOLIC BLOOD PRESSURE: 66 MMHG | SYSTOLIC BLOOD PRESSURE: 114 MMHG

## 2021-12-05 VITALS — SYSTOLIC BLOOD PRESSURE: 116 MMHG | DIASTOLIC BLOOD PRESSURE: 68 MMHG

## 2021-12-05 VITALS — SYSTOLIC BLOOD PRESSURE: 109 MMHG | DIASTOLIC BLOOD PRESSURE: 66 MMHG

## 2021-12-05 VITALS — DIASTOLIC BLOOD PRESSURE: 80 MMHG | SYSTOLIC BLOOD PRESSURE: 130 MMHG

## 2021-12-05 LAB
ANION GAP SERPL CALC-SCNC: 11 MMOL/L (ref 6–14)
BASOPHILS # BLD AUTO: 0 X10^3/UL (ref 0–0.2)
BASOPHILS NFR BLD: 0 % (ref 0–3)
BUN SERPL-MCNC: 17 MG/DL (ref 8–26)
CALCIUM SERPL-MCNC: 8.7 MG/DL (ref 8.5–10.1)
CHLORIDE SERPL-SCNC: 101 MMOL/L (ref 98–107)
CO2 SERPL-SCNC: 28 MMOL/L (ref 21–32)
CREAT SERPL-MCNC: 1 MG/DL (ref 0.7–1.3)
EOSINOPHIL NFR BLD: 0 % (ref 0–3)
EOSINOPHIL NFR BLD: 0 X10^3/UL (ref 0–0.7)
ERYTHROCYTE [DISTWIDTH] IN BLOOD BY AUTOMATED COUNT: 14.2 % (ref 11.5–14.5)
GFR SERPLBLD BASED ON 1.73 SQ M-ARVRAT: 75.2 ML/MIN
GLUCOSE SERPL-MCNC: 108 MG/DL (ref 70–99)
HCT VFR BLD CALC: 45.3 % (ref 39–53)
HGB BLD-MCNC: 15.7 G/DL (ref 13–17.5)
LYMPHOCYTES # BLD: 1.2 X10^3/UL (ref 1–4.8)
LYMPHOCYTES NFR BLD AUTO: 20 % (ref 24–48)
MCH RBC QN AUTO: 31 PG (ref 25–35)
MCHC RBC AUTO-ENTMCNC: 35 G/DL (ref 31–37)
MCV RBC AUTO: 90 FL (ref 79–100)
MONO #: 0.7 X10^3/UL (ref 0–1.1)
MONOCYTES NFR BLD: 11 % (ref 0–9)
NEUT #: 4.3 X10^3/UL (ref 1.8–7.7)
NEUTROPHILS NFR BLD AUTO: 69 % (ref 31–73)
PLATELET # BLD AUTO: 264 X10^3/UL (ref 140–400)
POTASSIUM SERPL-SCNC: 3.3 MMOL/L (ref 3.5–5.1)
RBC # BLD AUTO: 5.03 X10^6/UL (ref 4.3–5.7)
SODIUM SERPL-SCNC: 140 MMOL/L (ref 136–145)
WBC # BLD AUTO: 6.2 X10^3/UL (ref 4–11)

## 2021-12-05 RX ADMIN — HEPARIN SODIUM SCH UNIT: 5000 INJECTION, SOLUTION INTRAVENOUS; SUBCUTANEOUS at 14:23

## 2021-12-05 RX ADMIN — ASPIRIN SCH MG: 81 TABLET, COATED ORAL at 09:15

## 2021-12-05 RX ADMIN — PANTOPRAZOLE SODIUM SCH MG: 40 TABLET, DELAYED RELEASE ORAL at 05:58

## 2021-12-05 RX ADMIN — DEXAMETHASONE SCH MG: 4 TABLET ORAL at 09:16

## 2021-12-05 RX ADMIN — HEPARIN SODIUM SCH UNIT: 5000 INJECTION, SOLUTION INTRAVENOUS; SUBCUTANEOUS at 20:59

## 2021-12-05 RX ADMIN — REMDESIVIR SCH MLS/HR: 100 INJECTION, POWDER, LYOPHILIZED, FOR SOLUTION INTRAVENOUS at 11:47

## 2021-12-05 RX ADMIN — HEPARIN SODIUM SCH UNIT: 5000 INJECTION, SOLUTION INTRAVENOUS; SUBCUTANEOUS at 05:58

## 2021-12-05 RX ADMIN — LOSARTAN POTASSIUM SCH MG: 50 TABLET ORAL at 09:15

## 2021-12-05 RX ADMIN — TRIAMTERENE AND HYDROCHLOROTHIAZIDE SCH TAB: 37.5; 25 TABLET ORAL at 09:15

## 2021-12-05 NOTE — PDOC
[facility] PROGRESS NOTE


Date of Service


DOS:


DATE: 12/5/21 


TIME: 10:59





Chief Complaint


Chief Complaint


Covid-19 respiratory failure


Gastritis, hemorrhoids, reaction to anesthesia, umbilical


hernia repair, right arm nerve decompression.





History of Present Illness


History of Present Illness


12/5/2021


Patient seen and examined


Patient was walking from restroom to bed upon entry


Patient reports a cough after walk to and from restroom, which causes shortness 

of breath


Otherwise no shortness of breath while resting and continues to report feeling 

improved


3L O2 per NC, 91% sat


Chart reviewed


Discussed with RN





12/4/2021


Patient seen and examined


Patient is awake, alert, sitting and eating on side of bed, in NAD


Patient reports some shortness of breath on walking to and from the bathroom


Otherwise reports feeling quite better since admission


4L O2 per NC, 92% sat


Chart reviewed


Discussed with RN





Vitals/I&O


Vitals/I&O:





                                   Vital Signs








  Date Time  Temp Pulse Resp B/P (MAP) Pulse Ox O2 Delivery O2 Flow Rate FiO2


 


12/5/21 09:15  59  124/73    


 


12/5/21 07:00 97.7  14  91 Nasal Cannula 3.0 





 97.7       














                                    I & O   


 


 12/4/21 12/4/21 12/5/21





 15:00 23:00 07:00


 


Intake Total 230 ml  


 


Output Total   950 ml


 


Balance 230 ml  -950 ml











Physical Exam


General:  Alert, Oriented X3, No acute distress


Heart:  Regular rate, Normal S1, Normal S2, No murmurs


Abdomen:  Normal bowel sounds


Extremities:  No cyanosis, Normal pulses


Skin:  No rashes, No significant lesion





Labs


Labs:





Laboratory Tests








Test


 12/5/21


09:25


 


White Blood Count


 6.2 x10^3/uL


(4.0-11.0)


 


Red Blood Count


 5.03 x10^6/uL


(4.30-5.70)


 


Hemoglobin


 15.7 g/dL


(13.0-17.5)


 


Hematocrit


 45.3 %


(39.0-53.0)


 


Mean Corpuscular Volume 90 fL () 


 


Mean Corpuscular Hemoglobin 31 pg (25-35) 


 


Mean Corpuscular Hemoglobin


Concent 35 g/dL


(31-37)


 


Red Cell Distribution Width


 14.2 %


(11.5-14.5)


 


Platelet Count


 264 x10^3/uL


(140-400)


 


Neutrophils (%) (Auto) 69 % (31-73) 


 


Lymphocytes (%) (Auto) 20 % (24-48) 


 


Monocytes (%) (Auto) 11 % (0-9) 


 


Eosinophils (%) (Auto) 0 % (0-3) 


 


Basophils (%) (Auto) 0 % (0-3) 


 


Neutrophils # (Auto)


 4.3 x10^3/uL


(1.8-7.7)


 


Lymphocytes # (Auto)


 1.2 x10^3/uL


(1.0-4.8)


 


Monocytes # (Auto)


 0.7 x10^3/uL


(0.0-1.1)


 


Eosinophils # (Auto)


 0.0 x10^3/uL


(0.0-0.7)


 


Basophils # (Auto)


 0.0 x10^3/uL


(0.0-0.2)


 


Sodium Level


 140 mmol/L


(136-145)


 


Potassium Level


 3.3 mmol/L


(3.5-5.1)


 


Chloride Level


 101 mmol/L


()


 


Carbon Dioxide Level


 28 mmol/L


(21-32)


 


Anion Gap 11 (6-14) 


 


Blood Urea Nitrogen


 17 mg/dL


(8-26)


 


Creatinine


 1.0 mg/dL


(0.7-1.3)


 


Estimated GFR


(Cockcroft-Gault) 75.2 





 


Glucose Level


 108 mg/dL


(70-99)


 


Calcium Level


 8.7 mg/dL


(8.5-10.1)











Assessment and Plan


Assessmemt and Plan


Problems


Medical Problems:


(1) COVID


Status: Acute


Covid-19 respiratory failure


Gastritis, hemorrhoids, reaction to anesthesia, umbilical


hernia repair, right arm nerve decompression.








Plan:


Continue remdesivir


Continue 4L O2 per NC


Continue IV antibiotics


Continue vitamins and minerals


Continue beta agonist


Continue codeine cough syrup


Continue aspirin


Continue Lovenox


Home meds


DVT prophylaxis


Full code


Appreciate pulmonary input


Prognosis guarded.  








Comment


Review of Relevant


I have reviewed the following items gilbert (where applicable) has been applied.


Medications:





Current Medications








 Medications


  (Trade)  Dose


 Ordered  Sig/Rosalba


 Route


 PRN Reason  Start Time


 Stop Time Status Last Admin


Dose Admin


 


 Remdesivir 100 mg/


 Sodium Chloride  230 ml @ 


 460 mls/hr  Q24H


 IV


   12/4/21 11:30


 12/7/21 11:59  12/4/21 11:41





 


 Dexamethasone


  (Decadron)  6 mg  [id number]


 PO


   12/5/21 08:00


    12/5/21 09:16














Justifications for Admission


Other Justification














KASI CAO III DO            Dec 5, 2021 11:02

## 2021-12-05 NOTE — PDOC
PULMONARY PROGRESS NOTES


DATE: 12/5/21 


TIME: 05:56


Subjective


on 02 3 lpm    feels better   has cough


Vitals





Vital Signs








  Date Time  Temp Pulse Resp B/P (MAP) Pulse Ox O2 Delivery O2 Flow Rate FiO2


 


12/5/21 03:00 97.8 62 20 130/80 (97) 92 Nasal Cannula 3.0 





 97.8       








Comments


no distress


nc at


rrr


no accessory muscle use


abd obese


no rash


Labs





Laboratory Tests








Test


 12/4/21


06:00


 


Urine Collection Type Unknown 


 


Urine Color Yellow 


 


Urine Clarity Clear 


 


Urine pH 6.0 (<5.0-8.0) 


 


Urine Specific Gravity


 1.025


(1.000-1.030)


 


Urine Protein


 30 mg/dL


(NEG-TRACE)


 


Urine Glucose (UA)


 Negative mg/dL


(NEG)


 


Urine Ketones (Stick)


 Negative mg/dL


(NEG)


 


Urine Blood Trace (NEG) 


 


Urine Nitrite Negative (NEG) 


 


Urine Bilirubin Negative (NEG) 


 


Urine Urobilinogen Dipstick


 0.2 mg/dL (0.2


mg/dL)


 


Urine Leukocyte Esterase Negative (NEG) 


 


Urine RBC Occ /HPF (0-2) 


 


Urine WBC 0 /HPF (0-4) 


 


Urine Squamous Epithelial


Cells Few /LPF 





 


Urine Bacteria 0 /HPF (0-FEW) 








Laboratory Tests








Test


 12/4/21


06:00


 


Urine Collection Type Unknown 


 


Urine Color Yellow 


 


Urine Clarity Clear 


 


Urine pH 6.0 (<5.0-8.0) 


 


Urine Specific Gravity


 1.025


(1.000-1.030)


 


Urine Protein


 30 mg/dL


(NEG-TRACE)


 


Urine Glucose (UA)


 Negative mg/dL


(NEG)


 


Urine Ketones (Stick)


 Negative mg/dL


(NEG)


 


Urine Blood Trace (NEG) 


 


Urine Nitrite Negative (NEG) 


 


Urine Bilirubin Negative (NEG) 


 


Urine Urobilinogen Dipstick


 0.2 mg/dL (0.2


mg/dL)


 


Urine Leukocyte Esterase Negative (NEG) 


 


Urine RBC Occ /HPF (0-2) 


 


Urine WBC 0 /HPF (0-4) 


 


Urine Squamous Epithelial


Cells Few /LPF 





 


Urine Bacteria 0 /HPF (0-FEW) 








Medications





Active Scripts








 Medications  Dose


 Route/Sig


 Max Daily Dose Days Date Category


 


 Triamterene-Hctz


 37.5-25 Mg Cp


  (Triamterene/Hydrochlorothiazid)


 1 Each Capsule  1 Cap


 PO DAILY


   6/11/19 Reported


 


 Micardis Hct


 80-12.5 Mg Tablet


  (Telmisartan/Hydrochlorothiazid)


 1 Each Tablet  1 Tab


 PO DAILY


   6/11/19 Reported


 


 Ranitidine Hcl


 150 Mg Capsule  1 Cap


 PO BID


   12/21/16 Reported


 


 Aspir 81


  (Aspirin) 81 Mg


 Tablet.  1 Tab


 PO DAILY


   12/21/16 Reported











Impression


.


IMPRESSION:


1.  Acute hypoxemic respiratory failure secondary to COVID-19 viral pneumonia.


2.  COVID-19 viral pneumonia.


3.  Hypokalemia.


4.  Protein malnutrition, present upon admission.


5.  Respiratory failure secondary to COVID-19 viral pneumonia.





Plan


.


12/5


1.  dexamethasone  to po for total of 10 days


2.  Remdesivir.


3.  Empiric antibiotics.


4.  hep sq  for dvt prophylaxis


5.  02 titration to keep sat 90%


6.  start IS 





discussed w rn 





PLAN:


1.  dexamethasone.


2.  Remdesivir.


3.  Empiric antibiotics.


4.  hep sq  for dvt prophylaxis


5.  02 titration to keep sat 90%





discussed w PAIGE Chavez MD                Dec 5, 2021 05:57

## 2021-12-06 VITALS — SYSTOLIC BLOOD PRESSURE: 126 MMHG | DIASTOLIC BLOOD PRESSURE: 77 MMHG

## 2021-12-06 VITALS — SYSTOLIC BLOOD PRESSURE: 122 MMHG | DIASTOLIC BLOOD PRESSURE: 70 MMHG

## 2021-12-06 VITALS — DIASTOLIC BLOOD PRESSURE: 77 MMHG | SYSTOLIC BLOOD PRESSURE: 130 MMHG

## 2021-12-06 VITALS — SYSTOLIC BLOOD PRESSURE: 125 MMHG | DIASTOLIC BLOOD PRESSURE: 69 MMHG

## 2021-12-06 VITALS — DIASTOLIC BLOOD PRESSURE: 71 MMHG | SYSTOLIC BLOOD PRESSURE: 125 MMHG

## 2021-12-06 VITALS — DIASTOLIC BLOOD PRESSURE: 77 MMHG | SYSTOLIC BLOOD PRESSURE: 133 MMHG

## 2021-12-06 RX ADMIN — HEPARIN SODIUM SCH UNIT: 5000 INJECTION, SOLUTION INTRAVENOUS; SUBCUTANEOUS at 14:00

## 2021-12-06 RX ADMIN — PANTOPRAZOLE SODIUM SCH MG: 40 TABLET, DELAYED RELEASE ORAL at 08:43

## 2021-12-06 RX ADMIN — TRIAMTERENE AND HYDROCHLOROTHIAZIDE SCH TAB: 37.5; 25 TABLET ORAL at 08:43

## 2021-12-06 RX ADMIN — LOSARTAN POTASSIUM SCH MG: 50 TABLET ORAL at 08:43

## 2021-12-06 RX ADMIN — HEPARIN SODIUM SCH UNIT: 5000 INJECTION, SOLUTION INTRAVENOUS; SUBCUTANEOUS at 21:04

## 2021-12-06 RX ADMIN — ASPIRIN SCH MG: 81 TABLET, COATED ORAL at 08:43

## 2021-12-06 RX ADMIN — DEXAMETHASONE SCH MG: 4 TABLET ORAL at 08:43

## 2021-12-06 RX ADMIN — REMDESIVIR SCH MLS/HR: 100 INJECTION, POWDER, LYOPHILIZED, FOR SOLUTION INTRAVENOUS at 12:35

## 2021-12-06 RX ADMIN — HEPARIN SODIUM SCH UNIT: 5000 INJECTION, SOLUTION INTRAVENOUS; SUBCUTANEOUS at 05:39

## 2021-12-06 NOTE — PDOC
[facility] PROGRESS NOTE


Date of Service


DOS:


DATE: 12/6/21 


TIME: 11:53





Chief Complaint


Chief Complaint


Covid-19 respiratory failure


Gastritis, hemorrhoids, reaction to anesthesia, umbilical


hernia repair, right arm nerve decompression.





History of Present Illness


History of Present Illness


12/6/2021


Patient seen and examined


Chart reviewed 


Discussed with RN


Patient on day 4 of Remdesivir


Currently satting well on 2L O2





Vitals/I&O


Vitals/I&O:





                                   Vital Signs








  Date Time  Temp Pulse Resp B/P (MAP) Pulse Ox O2 Delivery O2 Flow Rate FiO2


 


12/6/21 08:43  56  126/77    


 


12/6/21 07:00 96.9  16  93 Nasal Cannula 3.0 





 96.9       














                                    I & O   


 


 12/5/21 12/5/21 12/6/21





 15:00 23:00 07:00


 


Intake Total 230 ml  360 ml


 


Balance 230 ml  360 ml











Physical Exam


General:  Alert, Oriented X3, No acute distress


Heart:  Regular rate, Normal S1, Normal S2, No murmurs


Lungs:  Clear


Abdomen:  Normal bowel sounds


Extremities:  No cyanosis, Normal pulses


Skin:  No rashes, No significant lesion





Assessment and Plan


Assessmemt and Plan


Problems


Medical Problems:


(1) COVID


Status: Acute  





Covid-19 respiratory failure


Gastritis, hemorrhoids, reaction to anesthesia, umbilical


hernia repair, right arm nerve decompression.








Plan:


Continue remdesivir


Continue 4L O2 per NC


Continue IV antibiotics


Continue vitamins and minerals


Continue beta agonist


Continue codeine cough syrup


Continue aspirin


Continue Lovenox


Home meds


DVT prophylaxis


Full code


Appreciate pulmonary input


Probable discharge tomorrow





Comment


Review of Relevant


I have reviewed the following items gilbert (where applicable) has been applied.





Justifications for Admission


Other Justification














KASI CAO III DO            Dec 6, 2021 11:54

## 2021-12-06 NOTE — PDOC
PULMONARY PROGRESS NOTES


DATE: 12/6/21 


TIME: 10:21


Subjective


on 02 3 lpm    feels better   has cough


Vitals





Vital Signs








  Date Time  Temp Pulse Resp B/P (MAP) Pulse Ox O2 Delivery O2 Flow Rate FiO2


 


12/6/21 08:43  56  126/77    


 


12/6/21 07:00 96.9  16  93 Nasal Cannula 3.0 





 96.9       








Comments


no distress


nc at


rrr


no accessory muscle use


abd obese


no rash


Labs





Laboratory Tests








Test


 12/5/21


09:25


 


White Blood Count


 6.2 x10^3/uL


(4.0-11.0)


 


Red Blood Count


 5.03 x10^6/uL


(4.30-5.70)


 


Hemoglobin


 15.7 g/dL


(13.0-17.5)


 


Hematocrit


 45.3 %


(39.0-53.0)


 


Mean Corpuscular Volume 90 fL () 


 


Mean Corpuscular Hemoglobin 31 pg (25-35) 


 


Mean Corpuscular Hemoglobin


Concent 35 g/dL


(31-37)


 


Red Cell Distribution Width


 14.2 %


(11.5-14.5)


 


Platelet Count


 264 x10^3/uL


(140-400)


 


Neutrophils (%) (Auto) 69 % (31-73) 


 


Lymphocytes (%) (Auto) 20 % (24-48) 


 


Monocytes (%) (Auto) 11 % (0-9) 


 


Eosinophils (%) (Auto) 0 % (0-3) 


 


Basophils (%) (Auto) 0 % (0-3) 


 


Neutrophils # (Auto)


 4.3 x10^3/uL


(1.8-7.7)


 


Lymphocytes # (Auto)


 1.2 x10^3/uL


(1.0-4.8)


 


Monocytes # (Auto)


 0.7 x10^3/uL


(0.0-1.1)


 


Eosinophils # (Auto)


 0.0 x10^3/uL


(0.0-0.7)


 


Basophils # (Auto)


 0.0 x10^3/uL


(0.0-0.2)


 


Sodium Level


 140 mmol/L


(136-145)


 


Potassium Level


 3.3 mmol/L


(3.5-5.1)


 


Chloride Level


 101 mmol/L


()


 


Carbon Dioxide Level


 28 mmol/L


(21-32)


 


Anion Gap 11 (6-14) 


 


Blood Urea Nitrogen


 17 mg/dL


(8-26)


 


Creatinine


 1.0 mg/dL


(0.7-1.3)


 


Estimated GFR


(Cockcroft-Gault) 75.2 





 


Glucose Level


 108 mg/dL


(70-99)


 


Calcium Level


 8.7 mg/dL


(8.5-10.1)








Medications





Active Scripts








 Medications  Dose


 Route/Sig


 Max Daily Dose Days Date Category


 


 Triamterene-Hctz


 37.5-25 Mg Cp


  (Triamterene/Hydrochlorothiazid)


 1 Each Capsule  1 Cap


 PO DAILY


   6/11/19 Reported


 


 Micardis Hct


 80-12.5 Mg Tablet


  (Telmisartan/Hydrochlorothiazid)


 1 Each Tablet  1 Tab


 PO DAILY


   6/11/19 Reported


 


 Ranitidine Hcl


 150 Mg Capsule  1 Cap


 PO BID


   12/21/16 Reported


 


 Aspir 81


  (Aspirin) 81 Mg


 Tablet.  1 Tab


 PO DAILY


   12/21/16 Reported











Impression


.


IMPRESSION:


1.  Acute hypoxemic respiratory failure secondary to COVID-19 viral pneumonia.


2.  COVID-19 viral pneumonia.


3.  Hypokalemia.


4.  Protein malnutrition, present upon admission.


5.  Respiratory failure secondary to COVID-19 viral pneumonia.





Plan


.


12/6


1.  dexamethasone  to po for total of 10 days


2.  Remdesivir.


3.  Empiric antibiotics.


4.  hep sq  for dvt prophylaxis


5.  02 titration to keep sat 90%


6.  start IS 


7.  Stable oxygen requirement.  We will sign off.





12/5


1.  dexamethasone  to po for total of 10 days


2.  Remdesivir.


3.  Empiric antibiotics.


4.  hep sq  for dvt prophylaxis


5.  02 titration to keep sat 90%


6.  start IS 





discussed w rn 





PLAN:


1.  dexamethasone.


2.  Remdesivir.


3.  Empiric antibiotics.


4.  hep sq  for dvt prophylaxis


5.  02 titration to keep sat 90%





discussed w rn











DEYANIRA KUNZ MD                  Dec 6, 2021 10:22

## 2021-12-06 NOTE — NUR
SW following. Discussed with RN, pt from home, 2L (does not use oxygen at home). COVID-19 
positive. Pt currently on Remdesivir. RN advised no SW needs at this time. SW will continue 
to follow.

## 2021-12-07 VITALS — SYSTOLIC BLOOD PRESSURE: 120 MMHG | DIASTOLIC BLOOD PRESSURE: 68 MMHG

## 2021-12-07 VITALS — DIASTOLIC BLOOD PRESSURE: 72 MMHG | SYSTOLIC BLOOD PRESSURE: 114 MMHG

## 2021-12-07 VITALS — DIASTOLIC BLOOD PRESSURE: 81 MMHG | SYSTOLIC BLOOD PRESSURE: 130 MMHG

## 2021-12-07 RX ADMIN — PANTOPRAZOLE SODIUM SCH MG: 40 TABLET, DELAYED RELEASE ORAL at 08:48

## 2021-12-07 RX ADMIN — DEXAMETHASONE SCH MG: 4 TABLET ORAL at 08:48

## 2021-12-07 RX ADMIN — TRIAMTERENE AND HYDROCHLOROTHIAZIDE SCH TAB: 37.5; 25 TABLET ORAL at 08:48

## 2021-12-07 RX ADMIN — HEPARIN SODIUM SCH UNIT: 5000 INJECTION, SOLUTION INTRAVENOUS; SUBCUTANEOUS at 05:57

## 2021-12-07 RX ADMIN — LOSARTAN POTASSIUM SCH MG: 50 TABLET ORAL at 08:47

## 2021-12-07 RX ADMIN — REMDESIVIR SCH MLS/HR: 100 INJECTION, POWDER, LYOPHILIZED, FOR SOLUTION INTRAVENOUS at 11:05

## 2021-12-07 RX ADMIN — ASPIRIN SCH MG: 81 TABLET, COATED ORAL at 08:53

## 2021-12-07 NOTE — NUR
Discharge Note:



JARED DOS SANTOS



Discharge instructions and discharge home medications reviewed with Patient and a copy 
given. All questions have been answered and understanding verbalized. 



The following instructions and handouts were given: discharge instructions, new 
prescriptions, education and follow up recommendations.



Discontinued lines and drains: Peripheral IV discontinued intact.



Patient discharged to Home or Self Care with Family Member via Wheelchair off unit by CNA.

## 2021-12-07 NOTE — PDOC
[facility] PROGRESS NOTE


Date of Service


DOS:


DATE: 12/7/21 


TIME: 13:42





Chief Complaint


Chief Complaint


Covid-19 respiratory failure


Gastritis, hemorrhoids, reaction to anesthesia, umbilical


hernia repair, right arm nerve decompression.





History of Present Illness


History of Present Illness





12/07/2012


Patient seen exam


He seems to be at his baseline


We will go ahead and discharge








12/6/2021


Patient seen and examined


Chart reviewed 


Discussed with RN


Patient on day 4 of Remdesivir


Currently satting well on 2L O2





Vitals/I&O


Vitals/I&O:





                                   Vital Signs








  Date Time  Temp Pulse Resp B/P (MAP) Pulse Ox O2 Delivery O2 Flow Rate FiO2


 


12/7/21 10:00 97.7 70 18 120/68 (85) 92 Nasal Cannula 3.0 





 97.7       














                                    I & O   


 


 12/6/21 12/6/21 12/7/21





 15:00 23:00 07:00


 


Intake Total 1040 ml  600 ml


 


Balance 1040 ml  600 ml











Physical Exam


General:  Alert, Oriented X3, No acute distress


Heart:  Regular rate, Normal S1, Normal S2, No murmurs


Abdomen:  Normal bowel sounds


Extremities:  No cyanosis, Normal pulses


Skin:  No rashes, No significant lesion





Assessment and Plan


Assessmemt and Plan


Problems


Medical Problems:


(1) COVID


Status: Acute  





Covid-19 respiratory failure


Gastritis, hemorrhoids, reaction to anesthesia, umbilical


hernia repair, right arm nerve decompression.








Plan:


Probable discharge this afternoon for now continue the following;





Continue 4L O2 per NC


Continue IV antibiotics


Continue vitamins and minerals


Continue beta agonist


Continue codeine cough syrup


Continue aspirin


Continue Lovenox


Home meds


DVT prophylaxis


Full code


Appreciate pulmonary input


Discharge see dictation





Comment


Review of Relevant


I have reviewed the following items gilbert (where applicable) has been applied.





Justifications for Admission


Other Justification














KASI CAO III DO            Dec 7, 2021 13:43

## 2021-12-07 NOTE — DS
DATE OF DISCHARGE: 12/07/2021

ADMITTING DIAGNOSIS:  COVID-19 respiratory failure.



DISCHARGE DIAGNOSES:  Resolving COVID-19 respiratory failure, history of 

gastritis, hemorrhoids, reaction to anesthesia, umbilical hernia repair, right 

arm sleeve and right arm nerve decompression.



CONSULTS:  Pulmonary Medicine.



PROCEDURES:  None.



HOSPITAL COURSE:  The patient is a pleasant middle-aged male who presented with 

COVID-19 respiratory failure.  He was admitted.  We gave remdesivir, 

Solu-Medrol, broad spectrum antibiotics, doxycycline, vitamins and minerals, 

beta agonist and oxygen, codeine, cough syrup, aspirin and Lovenox.  We 

consulted Pulmonary Medicine.  Today, I saw and examined him.  He is doing well 

and wants to go home.  We plan to discharge.



DISPOSITION:  Home.



ACTIVITY:  As tolerated.



DIET:  Low sodium.



MEDICATIONS:  Please see the MRAD.  Medrol Dosepak, doxycycline 100 b.i.d., 

amlodipine 10 a day, aspirin 81 a day, omeprazole 40 a day, Micardis 80 a day 

and triamterene/hydrochlorothiazide 37.5/25 one a day.



TOTAL TIME:  36 minutes.







ASHLEY

DR: CÉSAR/geovanni   DD: 12/07/2021 13:44

DT: 12/07/2021 14:01   TID: [id number]

## 2021-12-07 NOTE — PDOC
PULMONARY PROGRESS NOTES


DATE: 12/7/21 


TIME: 09:58


Subjective


Patient is now on room air.  Denies any shortness of breath.


Vitals





Vital Signs








  Date Time  Temp Pulse Resp B/P (MAP) Pulse Ox O2 Delivery O2 Flow Rate FiO2


 


12/7/21 08:48  64  114/72    


 


12/7/21 07:00 97.6  16  91 Nasal Cannula 3.0 





 97.6       








Comments


no distress


nc at


rrr


no accessory muscle use


abd obese


no rash


General:  Alert, No acute distress


Medications





Active Scripts








 Medications  Dose


 Route/Sig


 Max Daily Dose Days Date Category


 


 Triamterene-Hctz


 37.5-25 Mg Cp


  (Triamterene/Hydrochlorothiazid)


 1 Each Capsule  1 Cap


 PO DAILY


   6/11/19 Reported


 


 Micardis Hct


 80-12.5 Mg Tablet


  (Telmisartan/Hydrochlorothiazid)


 1 Each Tablet  1 Tab


 PO DAILY


   6/11/19 Reported


 


 Ranitidine Hcl


 150 Mg Capsule  1 Cap


 PO BID


   12/21/16 Reported


 


 Aspir 81


  (Aspirin) 81 Mg


 Tablet.  1 Tab


 PO DAILY


   12/21/16 Reported











Impression


.


IMPRESSION:


1.  Acute hypoxemic respiratory failure secondary to COVID-19 viral pneumonia.


2.  COVID-19 viral pneumonia.


3.  Hypokalemia.


4.  Protein malnutrition, present upon admission.


5.  Respiratory failure secondary to COVID-19 viral pneumonia.





Plan


.


12/7


1.  dexamethasone  to po for total of 10 days


2.  Remdesivir.


3.  Empiric antibiotics.


4.  hep sq  for dvt prophylaxis


5.  02 titration to keep sat 90%


6.   IS 


7.  Stable oxygen requirement.  We will sign off.  Okay for OK home today.








12/6


1.  dexamethasone  to po for total of 10 days


2.  Remdesivir.


3.  Empiric antibiotics.


4.  hep sq  for dvt prophylaxis


5.  02 titration to keep sat 90%


6.  start IS 


7.  Stable oxygen requirement.  We will sign off.





12/5


1.  dexamethasone  to po for total of 10 days


2.  Remdesivir.


3.  Empiric antibiotics.


4.  hep sq  for dvt prophylaxis


5.  02 titration to keep sat 90%


6.  start IS 





discussed w rn 





PLAN:


1.  dexamethasone.


2.  Remdesivir.


3.  Empiric antibiotics.


4.  hep sq  for dvt prophylaxis


5.  02 titration to keep sat 90%





discussed w DEYANIRA Sandhu MD                  Dec 7, 2021 09:59

## 2021-12-07 NOTE — NUR
SW following. Discussed with RN, pt completed 6 minute walk, does not need oxygen upon 
discharge. RN anticipates probable discharge home today. COVID-19 positive. RN advised no SW 
needs at this time. SW will continue to follow.